# Patient Record
Sex: MALE | Race: BLACK OR AFRICAN AMERICAN
[De-identification: names, ages, dates, MRNs, and addresses within clinical notes are randomized per-mention and may not be internally consistent; named-entity substitution may affect disease eponyms.]

---

## 2020-02-27 ENCOUNTER — HOSPITAL ENCOUNTER (EMERGENCY)
Dept: HOSPITAL 74 - JER | Age: 55
Discharge: HOME | End: 2020-02-27
Payer: COMMERCIAL

## 2020-02-27 VITALS — BODY MASS INDEX: 21.2 KG/M2

## 2020-02-27 VITALS — DIASTOLIC BLOOD PRESSURE: 69 MMHG | TEMPERATURE: 98 F | SYSTOLIC BLOOD PRESSURE: 122 MMHG | HEART RATE: 78 BPM

## 2020-02-27 DIAGNOSIS — K04.7: Primary | ICD-10-CM

## 2020-02-27 NOTE — PDOC
History of Present Illness





- General


History Source: Patient


Exam Limitations: No Limitations





- History of Present Illness


Initial Comments: 





02/27/20 07:56


 54-year-old male presents the ED with swelling to the left face along with 

dental pain for the past 2 days.  Patient states has had an abscess to this 

area before due to dental decay but due to lack of insurance he is unable to 

seek dental care.  Patient denies fever, chills history of diabetes or 

immunosuppression.  Patient states pain has subsided after taking Motrin this 

morning. patient has no visual complaints, ear pain, or difficulty swallowing


Is this a multiple visit Asthma Patient?: No


Timing/Duration: getting worse


Severity: mild


Associated Symptoms: reports: other





<Yin Mcdonough - Last Filed: 02/27/20 09:21>





<Karol Turner - Last Filed: 02/27/20 09:28>





- General


Chief Complaint: Wound


Stated Complaint: L SIDED FACIAL ABSCESS


Time Seen by Provider: 02/27/20 07:23





Past History





- Travel


Traveled outside of the country in the last 30 days: No


Close contact w/someone who was outside of country & ill: No





- Psycho Social/Smoking Cessation Hx


Smoking History: Current some day smoker


Have you smoked in the past 12 months: Yes


Information on smoking cessation initiated: Yes


Hx Alcohol Use: No


Drug/Substance Use Hx: No


Patient Lives Alone: No


Lives with/in: friend





<Yin Mcdonough - Last Filed: 02/27/20 09:21>





<Karol Turner - Last Filed: 02/27/20 09:28>





- Past Medical History


Allergies/Adverse Reactions: 


 Allergies











Allergy/AdvReac Type Severity Reaction Status Date / Time


 


No Known Allergies Allergy   Verified 02/27/20 07:24











Home Medications: 


Ambulatory Orders





Clindamycin [Cleocin -] 300 mg PO TID #20 capsule 02/27/20 











**Review of Systems





- Review of Systems


Able to Perform ROS?: No


Is the patient limited English proficient: No


Constitutional: No: Symptoms Reported


HEENTM: Yes: Mouth Pain, Dental Problems


Respiratory: No: Symptoms reported


Cardiac (ROS): No: Symptoms Reported


ABD/GI: No: Symptoms Reported


Musculoskeletal: No: Symptoms Reported


Integumentary: Yes: Erythema, Lumps


Neurological: No: Symptoms reported, Headache, Dizziness


Endocrine: No: Symptoms Reported


Hematologic/Lymphatic: No: Symptoms Reported





<Yin Mcdonough - Last Filed: 02/27/20 09:21>





*Physical Exam





- Vital Signs


 Last Vital Signs











Temp Pulse Resp BP Pulse Ox


 


 98 F   77   18   125/57 L  99 


 


 02/27/20 07:22  02/27/20 07:22  02/27/20 07:22  02/27/20 07:22  02/27/20 07:22














- Physical Exam


General Appearance: Yes: Nourished, Appropriately Dressed.  No: Apparent 

Distress


HEENT: positive: EOMI, CHANDA, TMs Normal, Other (Noted multiple decayed caries 

to gumline and surrounding erythema to the left upper quadrant)


Neck: positive: Supple.  negative: Lymphadenopathy (R), Lymphadenopathy (L)


Respiratory/Chest: positive: Lungs Clear, Normal Breath Sounds.  negative: 

Respiratory Distress, Accessory Muscle Use


Integumentary: positive: Erythema (With edema to left cheek and lower eye . 

area tender to touch. )


Neurologic: positive: Motor Strength 5/5 (Ambulatory)





<Yue Mcdonougha - Last Filed: 02/27/20 09:21>





- Vital Signs


 Last Vital Signs











Temp Pulse Resp BP Pulse Ox


 


 98 F   77   18   125/57 L  99 


 


 02/27/20 07:22  02/27/20 07:22  02/27/20 07:22  02/27/20 07:22  02/27/20 07:22














<Karol Turner - Last Filed: 02/27/20 09:28>





ED Treatment Course





- Medications


Given in the ED: 


ED Medications














Discontinued Medications














Generic Name Dose Route Start Last Admin





  Trade Name Devonteq  PRN Reason Stop Dose Admin


 


Clindamycin Phosphate  600 mg in 50 mls @ 100 mls/hr  02/27/20 07:52  02/27/20 

08:49





  Cleocin 600 Mg Premix Ivpb -  IVPB  02/27/20 08:21  100 mls/hr





  ONCE ONE   Administration





     





     





  Protocol   





     














<Karol Turner - Last Filed: 02/27/20 09:28>





Medical Decision Making





- Medical Decision Making





02/27/20 08:00


Chief complaint: Dental pain with dental decay now with bump to left cheek.  

Patient lacks dental insurance previous abscess approximately 8 months ago. 


Exam: Noted left upper quadrant abscess along with multiple decaying teeth.  

Left cheek with mild erythema extending to lower eye.  Area slightly warm to 

touch.


Plan: IV clindamycin 600 mg.  Will discharge home with the same


02/27/20 09:21


Patient will be given a dose of Motrin prior to discharge.  [facility] and is able to fill the prescription for clindamycin at $7.  Patient 

will be discharged home with referral to [facility]





<Yin Mcdonough - Last Filed: 02/27/20 09:21>





- Medical Decision Making





The patient was seen and evaluated in conjunction with midlevel provider under 

my direct supervision, ancillary studies were reviewed.  I agree with the plan 

as outlined with SUMAN Mcdonough. HPI, workup/dispo as outlined. VS reviewed, wnl. 


Vital Signs











Temp Pulse Resp BP Pulse Ox


 


 98 F   77   18   125/57 L  99 


 


 02/27/20 07:22  02/27/20 07:22  02/27/20 07:22  02/27/20 07:22  02/27/20 07:22








DC stable condition, clindamycin for infection.





02/27/20 09:28








<Karol Turner - Last Filed: 02/27/20 09:28>





Discharge





- Discharge Information


Problems reviewed: Yes





<Yin Mcdonough - Last Filed: 02/27/20 09:21>





<Karol Turner - Last Filed: 02/27/20 09:28>





- Discharge Information


Clinical Impression/Diagnosis: 


 Dental abscess





Condition: Improved


Disposition: HOME





- Additional Discharge Information


Prescriptions: 


Clindamycin [Cleocin -] 300 mg PO TID #20 capsule





- Patient Discharge Instructions


Patient Printed Discharge Instructions:  Tooth Abscess


Additional Instructions: 


Please take antibiotics until completed.


May take Motrin for discomfort.


Rinse after meals to avoid food getting stuck.


Follow-up at [address]


[place] [address]





Tel: [phone]